# Patient Record
Sex: FEMALE | Race: BLACK OR AFRICAN AMERICAN | NOT HISPANIC OR LATINO | Employment: PART TIME | URBAN - METROPOLITAN AREA
[De-identification: names, ages, dates, MRNs, and addresses within clinical notes are randomized per-mention and may not be internally consistent; named-entity substitution may affect disease eponyms.]

---

## 2017-11-29 ENCOUNTER — HOSPITAL ENCOUNTER (EMERGENCY)
Facility: HOSPITAL | Age: 22
Discharge: HOME | End: 2017-11-29
Attending: EMERGENCY MEDICINE
Payer: MEDICAID

## 2017-11-29 VITALS
HEART RATE: 71 BPM | TEMPERATURE: 98 F | HEIGHT: 64 IN | WEIGHT: 176 LBS | RESPIRATION RATE: 16 BRPM | SYSTOLIC BLOOD PRESSURE: 117 MMHG | BODY MASS INDEX: 30.05 KG/M2 | DIASTOLIC BLOOD PRESSURE: 60 MMHG | OXYGEN SATURATION: 100 %

## 2017-11-29 DIAGNOSIS — S60.012A CONTUSION OF LEFT THUMB WITHOUT DAMAGE TO NAIL, INITIAL ENCOUNTER: ICD-10-CM
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
## 2017-11-29 DIAGNOSIS — S29.019A THORACIC MYOFASCIAL STRAIN, INITIAL ENCOUNTER: Primary | ICD-10-CM

## 2017-11-29 PROCEDURE — 63600175 PHARM REV CODE 636 W HCPCS: Performed by: EMERGENCY MEDICINE

## 2017-11-29 PROCEDURE — 96372 THER/PROPH/DIAG INJ SC/IM: CPT

## 2017-11-29 PROCEDURE — 99283 EMERGENCY DEPT VISIT LOW MDM: CPT | Mod: 25

## 2017-11-29 RX ORDER — HYDROCODONE BITARTRATE AND ACETAMINOPHEN 5; 325 MG/1; MG/1
1 TABLET ORAL EVERY 4 HOURS PRN
Qty: 18 TABLET | Refills: 0 | Status: SHIPPED | OUTPATIENT
Start: 2017-11-29 | End: 2022-11-23

## 2017-11-29 RX ORDER — ORPHENADRINE CITRATE 30 MG/ML
60 INJECTION INTRAMUSCULAR; INTRAVENOUS
Status: COMPLETED | OUTPATIENT
Start: 2017-11-29 | End: 2017-11-29

## 2017-11-29 RX ORDER — MORPHINE SULFATE 2 MG/ML
4 INJECTION, SOLUTION INTRAMUSCULAR; INTRAVENOUS ONCE
Status: COMPLETED | OUTPATIENT
Start: 2017-11-29 | End: 2017-11-29

## 2017-11-29 RX ORDER — CYCLOBENZAPRINE HCL 10 MG
10 TABLET ORAL 3 TIMES DAILY PRN
Qty: 15 TABLET | Refills: 0 | Status: SHIPPED | OUTPATIENT
Start: 2017-11-29 | End: 2017-12-04

## 2017-11-29 RX ADMIN — ORPHENADRINE CITRATE 60 MG: 30 INJECTION INTRAMUSCULAR; INTRAVENOUS at 07:11

## 2017-11-29 RX ADMIN — MORPHINE SULFATE 4 MG: 2 INJECTION, SOLUTION INTRAMUSCULAR; INTRAVENOUS at 07:11

## 2017-11-30 NOTE — ED NOTES
Patient identifiers verified and correct for Manjeet Vizcaino.    LOC: The patient is awake, alert and aware of environment with an appropriate affect, the patient is oriented x 3 and speaking appropriately.  APPEARANCE: Patient resting comfortably and in no acute distress, patient is clean and well groomed, patient's clothing is properly fastened.  SKIN: The skin is warm and dry, color consistent with ethnicity, patient has normal skin turgor and moist mucus membranes, skin intact, no breakdown or bruising noted.  MUSCULOSKELETAL: Patient moving all extremities spontaneously, no obvious swelling or deformities noted. C/o headache with upper back pain. Gait WNL  RESPIRATORY: Airway is open and patent, respirations are spontaneous, patient has a normal effort and rate, no accessory muscle use noted.  CARDIAC: Patient has a normal rate and regular rhythm, no periphreal edema noted, capillary refill < 3 seconds.
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
## 2018-03-05 PROBLEM — Z91.89 AT RISK FOR IMPAIRED MOBILITY DUE TO PAIN: Status: ACTIVE | Noted: 2018-03-05

## 2022-11-23 ENCOUNTER — HOSPITAL ENCOUNTER (EMERGENCY)
Facility: HOSPITAL | Age: 27
Discharge: HOME | End: 2022-11-23
Attending: EMERGENCY MEDICINE
Payer: MEDICAID

## 2022-11-23 VITALS
SYSTOLIC BLOOD PRESSURE: 122 MMHG | OXYGEN SATURATION: 100 % | HEART RATE: 76 BPM | DIASTOLIC BLOOD PRESSURE: 73 MMHG | RESPIRATION RATE: 20 BRPM | WEIGHT: 211 LBS | TEMPERATURE: 98 F | BODY MASS INDEX: 36.22 KG/M2

## 2022-11-23 DIAGNOSIS — O99.891 ASYMPTOMATIC BACTERIURIA DURING PREGNANCY: ICD-10-CM

## 2022-11-23 DIAGNOSIS — O21.9 EXCESSIVE VOMITING IN PREGNANCY: Primary | ICD-10-CM

## 2022-11-23 DIAGNOSIS — R82.71 ASYMPTOMATIC BACTERIURIA DURING PREGNANCY: ICD-10-CM

## 2022-11-23 LAB
ALBUMIN SERPL BCP-MCNC: 3.5 G/DL (ref 3.5–5.2)
ALP SERPL-CCNC: 53 U/L (ref 55–135)
ALT SERPL W/O P-5'-P-CCNC: 21 U/L (ref 10–44)
ANION GAP SERPL CALC-SCNC: 12 MMOL/L (ref 8–16)
AST SERPL-CCNC: 29 U/L (ref 10–40)
BACTERIA #/AREA URNS HPF: ABNORMAL /HPF
BASOPHILS # BLD AUTO: 0.03 K/UL (ref 0–0.2)
BASOPHILS NFR BLD: 0.4 % (ref 0–1.9)
BILIRUB SERPL-MCNC: 0.8 MG/DL (ref 0.1–1)
BILIRUB UR QL STRIP: NEGATIVE
BUN SERPL-MCNC: 7 MG/DL (ref 6–20)
CALCIUM SERPL-MCNC: 8.9 MG/DL (ref 8.7–10.5)
CHLORIDE SERPL-SCNC: 108 MMOL/L (ref 95–110)
CLARITY UR: ABNORMAL
CO2 SERPL-SCNC: 17 MMOL/L (ref 23–29)
COLOR UR: YELLOW
CREAT SERPL-MCNC: 0.8 MG/DL (ref 0.5–1.4)
DIFFERENTIAL METHOD: ABNORMAL
EOSINOPHIL # BLD AUTO: 0.1 K/UL (ref 0–0.5)
EOSINOPHIL NFR BLD: 0.9 % (ref 0–8)
ERYTHROCYTE [DISTWIDTH] IN BLOOD BY AUTOMATED COUNT: 15.1 % (ref 11.5–14.5)
EST. GFR  (NO RACE VARIABLE): >60 ML/MIN/1.73 M^2
GLUCOSE SERPL-MCNC: 82 MG/DL (ref 70–110)
GLUCOSE UR QL STRIP: NEGATIVE
HCT VFR BLD AUTO: 35.5 % (ref 37–48.5)
HGB BLD-MCNC: 11.4 G/DL (ref 12–16)
HGB UR QL STRIP: NEGATIVE
IMM GRANULOCYTES # BLD AUTO: 0.01 K/UL (ref 0–0.04)
IMM GRANULOCYTES NFR BLD AUTO: 0.1 % (ref 0–0.5)
KETONES UR QL STRIP: NEGATIVE
LEUKOCYTE ESTERASE UR QL STRIP: ABNORMAL
LIPASE SERPL-CCNC: 33 U/L (ref 4–60)
LYMPHOCYTES # BLD AUTO: 1.9 K/UL (ref 1–4.8)
LYMPHOCYTES NFR BLD: 25.9 % (ref 18–48)
MCH RBC QN AUTO: 27.2 PG (ref 27–31)
MCHC RBC AUTO-ENTMCNC: 32.1 G/DL (ref 32–36)
MCV RBC AUTO: 85 FL (ref 82–98)
MICROSCOPIC COMMENT: ABNORMAL
MONOCYTES # BLD AUTO: 0.7 K/UL (ref 0.3–1)
MONOCYTES NFR BLD: 9 % (ref 4–15)
NEUTROPHILS # BLD AUTO: 4.7 K/UL (ref 1.8–7.7)
NEUTROPHILS NFR BLD: 63.7 % (ref 38–73)
NITRITE UR QL STRIP: NEGATIVE
NRBC BLD-RTO: 0 /100 WBC
PH UR STRIP: 6 [PH] (ref 5–8)
PLATELET # BLD AUTO: 167 K/UL (ref 150–450)
PMV BLD AUTO: 10.9 FL (ref 9.2–12.9)
POTASSIUM SERPL-SCNC: 5 MMOL/L (ref 3.5–5.1)
PROT SERPL-MCNC: 6.9 G/DL (ref 6–8.4)
PROT UR QL STRIP: NEGATIVE
RBC # BLD AUTO: 4.19 M/UL (ref 4–5.4)
RBC #/AREA URNS HPF: 6 /HPF (ref 0–4)
SODIUM SERPL-SCNC: 137 MMOL/L (ref 136–145)
SP GR UR STRIP: 1.02 (ref 1–1.03)
SQUAMOUS #/AREA URNS HPF: 8 /HPF
UNIDENT CRYS URNS QL MICRO: 2
URN SPEC COLLECT METH UR: ABNORMAL
UROBILINOGEN UR STRIP-ACNC: ABNORMAL EU/DL
WBC # BLD AUTO: 7.44 K/UL (ref 3.9–12.7)
WBC #/AREA URNS HPF: 6 /HPF (ref 0–5)
YEAST URNS QL MICRO: ABNORMAL

## 2022-11-23 PROCEDURE — 85025 COMPLETE CBC W/AUTO DIFF WBC: CPT | Performed by: NURSE PRACTITIONER

## 2022-11-23 PROCEDURE — 81000 URINALYSIS NONAUTO W/SCOPE: CPT | Performed by: NURSE PRACTITIONER

## 2022-11-23 PROCEDURE — 25000003 PHARM REV CODE 250: Performed by: NURSE PRACTITIONER

## 2022-11-23 PROCEDURE — 83690 ASSAY OF LIPASE: CPT | Performed by: EMERGENCY MEDICINE

## 2022-11-23 PROCEDURE — 96361 HYDRATE IV INFUSION ADD-ON: CPT

## 2022-11-23 PROCEDURE — 80053 COMPREHEN METABOLIC PANEL: CPT | Performed by: NURSE PRACTITIONER

## 2022-11-23 PROCEDURE — 25000003 PHARM REV CODE 250: Performed by: EMERGENCY MEDICINE

## 2022-11-23 PROCEDURE — 63600175 PHARM REV CODE 636 W HCPCS: Performed by: EMERGENCY MEDICINE

## 2022-11-23 PROCEDURE — 96374 THER/PROPH/DIAG INJ IV PUSH: CPT

## 2022-11-23 PROCEDURE — 99284 EMERGENCY DEPT VISIT MOD MDM: CPT | Mod: 25

## 2022-11-23 RX ORDER — IBUPROFEN 800 MG/1
TABLET ORAL
COMMUNITY
End: 2022-11-23

## 2022-11-23 RX ORDER — FERROUS SULFATE 325(65) MG
TABLET ORAL
COMMUNITY
End: 2022-11-23

## 2022-11-23 RX ORDER — ETONOGESTREL 68 MG/1
IMPLANT SUBCUTANEOUS
COMMUNITY
End: 2022-11-23

## 2022-11-23 RX ORDER — FAMOTIDINE 20 MG/1
TABLET, FILM COATED ORAL
COMMUNITY
End: 2022-11-23

## 2022-11-23 RX ORDER — AMOXICILLIN 500 MG/1
CAPSULE ORAL
COMMUNITY
End: 2022-11-23

## 2022-11-23 RX ORDER — CEPHALEXIN 500 MG/1
500 CAPSULE ORAL 4 TIMES DAILY
Qty: 20 CAPSULE | Refills: 0 | Status: SHIPPED | OUTPATIENT
Start: 2022-11-23 | End: 2022-11-28

## 2022-11-23 RX ORDER — PROMETHAZINE HYDROCHLORIDE 25 MG/1
25 TABLET ORAL EVERY 8 HOURS PRN
COMMUNITY
Start: 2022-11-22 | End: 2022-11-23

## 2022-11-23 RX ORDER — PRENATAL WITH FERROUS FUM AND FOLIC ACID 3080; 920; 120; 400; 22; 1.84; 3; 20; 10; 1; 12; 200; 27; 25; 2 [IU]/1; [IU]/1; MG/1; [IU]/1; MG/1; MG/1; MG/1; MG/1; MG/1; MG/1; UG/1; MG/1; MG/1; MG/1; MG/1
1 TABLET ORAL DAILY
COMMUNITY
Start: 2022-10-24 | End: 2022-11-23

## 2022-11-23 RX ORDER — PROMETHAZINE HYDROCHLORIDE 25 MG/1
TABLET ORAL
COMMUNITY
Start: 2022-11-22 | End: 2022-11-23

## 2022-11-23 RX ORDER — METRONIDAZOLE 500 MG/1
TABLET ORAL
COMMUNITY
End: 2022-11-23

## 2022-11-23 RX ORDER — ONDANSETRON 2 MG/ML
8 INJECTION INTRAMUSCULAR; INTRAVENOUS
Status: COMPLETED | OUTPATIENT
Start: 2022-11-23 | End: 2022-11-23

## 2022-11-23 RX ORDER — CEPHALEXIN 500 MG/1
500 CAPSULE ORAL
Status: COMPLETED | OUTPATIENT
Start: 2022-11-23 | End: 2022-11-23

## 2022-11-23 RX ORDER — ONDANSETRON HYDROCHLORIDE 8 MG/1
8 TABLET, FILM COATED ORAL EVERY 8 HOURS PRN
COMMUNITY
Start: 2022-11-14 | End: 2023-04-29

## 2022-11-23 RX ORDER — ONDANSETRON 4 MG/1
4 TABLET, FILM COATED ORAL EVERY 6 HOURS
Qty: 12 TABLET | Refills: 0 | OUTPATIENT
Start: 2022-11-23 | End: 2023-04-29

## 2022-11-23 RX ORDER — METRONIDAZOLE 500 MG/1
500 TABLET ORAL 2 TIMES DAILY
COMMUNITY
Start: 2022-09-19 | End: 2022-11-23

## 2022-11-23 RX ORDER — FAMOTIDINE 20 MG/1
20 TABLET, FILM COATED ORAL 2 TIMES DAILY
COMMUNITY
Start: 2022-09-08 | End: 2022-11-23

## 2022-11-23 RX ADMIN — ONDANSETRON HYDROCHLORIDE 8 MG: 2 SOLUTION INTRAMUSCULAR; INTRAVENOUS at 02:11

## 2022-11-23 RX ADMIN — CEPHALEXIN 500 MG: 500 CAPSULE ORAL at 04:11

## 2022-11-23 RX ADMIN — SODIUM CHLORIDE 1000 ML: 0.9 INJECTION, SOLUTION INTRAVENOUS at 02:11

## 2022-11-23 NOTE — ED PROVIDER NOTES
Encounter Date: 11/23/2022       History     Chief Complaint   Patient presents with    Emesis During Pregnancy     Nausea and vomiting x2 weeks. Pt is 8 weeks pregnant and says her OB prescribed her ondansetron and promethazine for nausea, but has not gotten any relief from them. Denying any other complaints at this time      Patient is a 27-year-old female who is 8 weeks pregnant and complains of nausea and vomiting over the past few days.  She is been taking Phenergan and Zofran at home without relief.  She has mild lower abdominal cramping, but no vaginal bleeding.  No syncope.  She denies fever or chills.    Review of patient's allergies indicates:   Allergen Reactions    Aspirin Other (See Comments)     unknown     Past Medical History:   Diagnosis Date    Mental disorder     anxiety    Sickle cell anemia     sickle cell trait per prenatals, pt denies     No past surgical history on file.  No family history on file.  Social History     Tobacco Use    Smoking status: Never    Smokeless tobacco: Never   Substance Use Topics    Alcohol use: No    Drug use: No     Comment: + THC on prenatals, pt denies.      Review of Systems   Constitutional:  Positive for fatigue. Negative for chills and fever.   Respiratory:  Negative for shortness of breath.    Cardiovascular:  Negative for chest pain.   Gastrointestinal:  Positive for nausea and vomiting. Negative for diarrhea.   Genitourinary:  Positive for pelvic pain. Negative for vaginal bleeding.   Skin:  Negative for color change and rash.   Neurological:  Negative for syncope.     Physical Exam     Initial Vitals   BP Pulse Resp Temp SpO2   11/23/22 1358 11/23/22 1358 11/23/22 1358 11/23/22 1358 11/23/22 1819   117/78 78 18 98.5 °F (36.9 °C) 100 %      MAP       --                Physical Exam    Nursing note and vitals reviewed.  Constitutional: No distress.   HENT:   Dry mucous membranes.   Eyes: Conjunctivae and EOM are normal.   Neck: Neck supple.   Cardiovascular:   Normal rate, regular rhythm and normal heart sounds.           Pulmonary/Chest: Breath sounds normal.   Abdominal: Abdomen is soft. There is no abdominal tenderness.   Musculoskeletal:         General: No edema. Normal range of motion.      Cervical back: Neck supple.     Neurological: She is oriented to person, place, and time.   Skin: Skin is warm and dry.       ED Course   Procedures  Labs Reviewed   URINALYSIS, REFLEX TO URINE CULTURE - Abnormal; Notable for the following components:       Result Value    Appearance, UA Hazy (*)     Urobilinogen, UA 4.0-6.0 (*)     Leukocytes, UA 3+ (*)     All other components within normal limits    Narrative:     Specimen Source->Urine   COMPREHENSIVE METABOLIC PANEL - Abnormal; Notable for the following components:    CO2 17 (*)     Alkaline Phosphatase 53 (*)     All other components within normal limits   CBC W/ AUTO DIFFERENTIAL - Abnormal; Notable for the following components:    Hemoglobin 11.4 (*)     Hematocrit 35.5 (*)     RDW 15.1 (*)     All other components within normal limits   URINALYSIS MICROSCOPIC - Abnormal; Notable for the following components:    RBC, UA 6 (*)     WBC, UA 6 (*)     All other components within normal limits    Narrative:     Specimen Source->Urine   LIPASE          Imaging Results    None          Medications   sodium chloride 0.9% bolus 1,000 mL (0 mLs Intravenous Stopped 11/23/22 1549)   ondansetron injection 8 mg (8 mg Intravenous Given 11/23/22 1450)   cephALEXin capsule 500 mg (500 mg Oral Given 11/23/22 1646)     Medical Decision Making:   Initial Assessment:   27-year-old pregnant female with vomiting.  ED Management:  IV fluids and Zofran have ordered for the patient.  Lab work is pending at the time of shift change and further care of the patient will be turned over to Dr. Banuelos, pending results, reassessment and final disposition.                        Clinical Impression:   Final diagnoses:  [O21.9] Excessive vomiting in  pregnancy (Primary)  [O99.891, R82.71] Asymptomatic bacteriuria during pregnancy      ED Disposition Condition    Discharge Stable          ED Prescriptions       Medication Sig Dispense Start Date End Date Auth. Provider    cephALEXin (KEFLEX) 500 MG capsule Take 1 capsule (500 mg total) by mouth 4 (four) times daily. for 5 days 20 capsule 11/23/2022 11/28/2022 Jade Banuelos MD    ondansetron (ZOFRAN) 4 MG tablet Take 1 tablet (4 mg total) by mouth every 6 (six) hours. 12 tablet 11/23/2022 -- Jade Banuelos MD          Follow-up Information       Follow up With Specialties Details Why Contact Info    [facility]   [address] [place] [address] [id number] [phone]             Paras Hoover MD  11/26/22 0612

## 2022-11-24 NOTE — DISCHARGE INSTRUCTIONS
You have bacteria in your urine and need to be treated for this as you are pregnant. Please follow up with the OB. Take the anti-nausea medication as needed for nausea and vomiting.

## 2022-12-06 ENCOUNTER — HOSPITAL ENCOUNTER (EMERGENCY)
Facility: HOSPITAL | Age: 27
Discharge: HOME | End: 2022-12-06
Attending: EMERGENCY MEDICINE
Payer: MEDICAID

## 2022-12-06 VITALS
SYSTOLIC BLOOD PRESSURE: 114 MMHG | DIASTOLIC BLOOD PRESSURE: 71 MMHG | HEIGHT: 64 IN | BODY MASS INDEX: 35.68 KG/M2 | TEMPERATURE: 99 F | RESPIRATION RATE: 18 BRPM | WEIGHT: 209 LBS | HEART RATE: 84 BPM | OXYGEN SATURATION: 98 %

## 2022-12-06 DIAGNOSIS — O20.0 THREATENED MISCARRIAGE: Primary | ICD-10-CM

## 2022-12-06 LAB
B-HCG UR QL: POSITIVE
CTP QC/QA: YES
HCG INTACT+B SERPL-ACNC: NORMAL MIU/ML

## 2022-12-06 PROCEDURE — 99284 EMERGENCY DEPT VISIT MOD MDM: CPT | Mod: 25,ER

## 2022-12-06 PROCEDURE — 81025 URINE PREGNANCY TEST: CPT | Mod: ER | Performed by: EMERGENCY MEDICINE

## 2022-12-06 PROCEDURE — 84702 CHORIONIC GONADOTROPIN TEST: CPT | Mod: ER | Performed by: EMERGENCY MEDICINE

## 2022-12-06 NOTE — ED PROVIDER NOTES
Encounter Date: 12/6/2022       History     Chief Complaint   Patient presents with    Vaginal Bleeding     Pt reports being 10 weeks pregnant and states she woke up to feeling wet, and noticed she was bleeding. Pt states she put on a pad and realized it was heavier and the blood was darker. Pt denies abdominal cramping. Pt denies falling or being hit in the stomach.     27-year-old female approximately 10 weeks pregnant presenting with vaginal bleeding and mild cramping this morning.  Patient says she had an ultrasound done at 6 weeks but it did not show anything.  She denies fever, dysuria.    Review of patient's allergies indicates:   Allergen Reactions    Aspirin Other (See Comments)     unknown     Past Medical History:   Diagnosis Date    Mental disorder     anxiety    Sickle cell anemia     sickle cell trait per prenatals, pt denies     No past surgical history on file.  No family history on file.  Social History     Tobacco Use    Smoking status: Never    Smokeless tobacco: Never   Substance Use Topics    Alcohol use: No    Drug use: No     Comment: + THC on prenatals, pt denies.      Review of Systems   Constitutional:  Negative for fever.   Eyes:  Negative for pain.   Respiratory:  Negative for shortness of breath.    Cardiovascular:  Negative for chest pain.   Gastrointestinal:  Negative for abdominal pain, nausea and vomiting.   Genitourinary:  Positive for pelvic pain and vaginal bleeding. Negative for difficulty urinating.   Musculoskeletal:  Negative for back pain and neck pain.   Neurological:  Negative for headaches.   Psychiatric/Behavioral:  Negative for confusion.      Physical Exam     Initial Vitals [12/06/22 0656]   BP Pulse Resp Temp SpO2   135/73 89 18 98.6 °F (37 °C) 100 %      MAP       --         Physical Exam    Nursing note and vitals reviewed.  HENT:   Head: Atraumatic.   Eyes: Conjunctivae and EOM are normal.   Cardiovascular:  Normal rate.           Pulmonary/Chest: No respiratory  distress.   Abdominal: Abdomen is soft. She exhibits no distension. There is no abdominal tenderness. There is no rebound.     Neurological: She is alert and oriented to person, place, and time.       ED Course   Procedures  Labs Reviewed   POCT URINE PREGNANCY - Abnormal; Notable for the following components:       Result Value    POC Preg Test, Ur Positive (*)     All other components within normal limits   HCG, QUANTITATIVE          Imaging Results              US OB <14 Wks, TransAbd, Single Gestation (Final result)  Result time 12/06/22 08:34:04      Final result by JUAN CARLOS Beyer Sr., MD (12/06/22 08:34:04)                   Impression:      1. There is an intrauterine pregnancy with a fetal heart rate of 163 beats per minute.    2. Based on ultrasound measurements, the calculated gestational age is 11 weeks and 1 day.    3. The maternal ovaries were not visualized.    4.  I recommend a follow-up ultrasound examination in 7 weeks for a fetal anatomical survey.      Electronically signed by: Cristobal Beyer MD  Date:    12/06/2022  Time:    08:34               Narrative:    EXAMINATION:  US OB <14 WEEKS TRANSABDOM, SINGLE GESTATION    CLINICAL HISTORY:  Vaginal bleeding;    TECHNIQUE:  Multiple static ultrasound images are submitted for interpretation.    COMPARISON:  None    FINDINGS:  The maternal uterus measures 12.7 cm in craniocaudal dimension by 9.0 cm in AP dimension by 9.3 cm in medial-lateral dimension.  There is an intrauterine pregnancy with a fetal heart rate of 163 beats per minute.  The gestational sac, yolk sac, and fetal pole are normal in appearance.  There is a normal appearing amount of amniotic fluid.  The mean crown-rump length is 41.94 mm.  Based on ultrasound measurements, the calculated gestational age is 11 weeks and 1 day.    The maternal urinary bladder is normal in appearance.  There is no significant amount of free fluid visualized within the maternal pelvis.  The maternal ovaries  were not visualized.                                       Medications - No data to display  Medical Decision Making:   Initial Assessment:   27-year-old female presenting with first-trimester vaginal bleeding.  Vital signs unremarkable.  Abdomen benign.  Patient appears to be in no distress.  UPT positive.  Pelvic ultrasound shows intrauterine gestation without evidence of extra uterine masses.  Findings consistent with threatened miscarriage.  Plan for close OB follow-up.                        Clinical Impression:   Final diagnoses:  [O20.0] Threatened miscarriage (Primary)      ED Disposition Condition    Discharge Stable          ED Prescriptions    None       Follow-up Information       Follow up With Specialties Details Why Contact Info    Your OBGYN  Schedule an appointment as soon as possible for a visit in 3 days               Humble Cavanaugh MD  12/06/22 3483

## 2023-04-29 ENCOUNTER — HOSPITAL ENCOUNTER (EMERGENCY)
Facility: HOSPITAL | Age: 28
Discharge: HOME | End: 2023-04-29
Attending: FAMILY MEDICINE
Payer: MEDICAID

## 2023-04-29 VITALS
OXYGEN SATURATION: 100 % | RESPIRATION RATE: 19 BRPM | SYSTOLIC BLOOD PRESSURE: 103 MMHG | BODY MASS INDEX: 34.31 KG/M2 | DIASTOLIC BLOOD PRESSURE: 59 MMHG | TEMPERATURE: 98 F | HEIGHT: 64 IN | HEART RATE: 71 BPM | WEIGHT: 201 LBS

## 2023-04-29 DIAGNOSIS — E87.6 HYPOKALEMIA: ICD-10-CM

## 2023-04-29 DIAGNOSIS — K29.01 ACUTE SUPERFICIAL GASTRITIS WITH HEMORRHAGE: Primary | ICD-10-CM

## 2023-04-29 DIAGNOSIS — R74.8 ELEVATED LIVER ENZYMES: ICD-10-CM

## 2023-04-29 DIAGNOSIS — Z3A.30 30 WEEKS GESTATION OF PREGNANCY: ICD-10-CM

## 2023-04-29 LAB
ALBUMIN SERPL BCP-MCNC: 3.4 G/DL (ref 3.5–5.2)
ALP SERPL-CCNC: 86 U/L (ref 38–126)
ALT SERPL W/O P-5'-P-CCNC: 80 U/L (ref 10–44)
ANION GAP SERPL CALC-SCNC: 6 MMOL/L (ref 8–16)
APTT PPP: <21 SEC (ref 21–32)
AST SERPL-CCNC: 68 U/L (ref 15–46)
BASOPHILS # BLD AUTO: 0.04 K/UL (ref 0–0.2)
BASOPHILS NFR BLD: 0.4 % (ref 0–1.9)
BILIRUB SERPL-MCNC: 0.8 MG/DL (ref 0.1–1)
BILIRUB UR QL STRIP: NEGATIVE
CALCIUM SERPL-MCNC: 8.7 MG/DL (ref 8.7–10.5)
CHLORIDE SERPL-SCNC: 109 MMOL/L (ref 95–110)
CLARITY UR REFRACT.AUTO: CLEAR
CO2 SERPL-SCNC: 19 MMOL/L (ref 23–29)
COLOR UR AUTO: YELLOW
CREAT SERPL-MCNC: 0.61 MG/DL (ref 0.5–1.4)
DIFFERENTIAL METHOD: ABNORMAL
EOSINOPHIL # BLD AUTO: 0.4 K/UL (ref 0–0.5)
EOSINOPHIL NFR BLD: 3.8 % (ref 0–8)
ERYTHROCYTE [DISTWIDTH] IN BLOOD BY AUTOMATED COUNT: 15.6 % (ref 11.5–14.5)
EST. GFR  (NO RACE VARIABLE): >60 ML/MIN/1.73 M^2
GLUCOSE SERPL-MCNC: 83 MG/DL (ref 70–110)
GLUCOSE UR QL STRIP: NEGATIVE
HCT VFR BLD AUTO: 32.2 % (ref 37–48.5)
HGB BLD-MCNC: 10.4 G/DL (ref 12–16)
HGB UR QL STRIP: NEGATIVE
IMM GRANULOCYTES # BLD AUTO: 0.02 K/UL (ref 0–0.04)
IMM GRANULOCYTES NFR BLD AUTO: 0.2 % (ref 0–0.5)
INR PPP: 0.9 (ref 0.8–1.2)
KETONES UR QL STRIP: ABNORMAL
LEUKOCYTE ESTERASE UR QL STRIP: NEGATIVE
LIPASE SERPL-CCNC: 160 U/L (ref 23–300)
LYMPHOCYTES # BLD AUTO: 2.1 K/UL (ref 1–4.8)
LYMPHOCYTES NFR BLD: 21.4 % (ref 18–48)
MCH RBC QN AUTO: 26.6 PG (ref 27–31)
MCHC RBC AUTO-ENTMCNC: 32.3 G/DL (ref 32–36)
MCV RBC AUTO: 82 FL (ref 82–98)
MONOCYTES # BLD AUTO: 0.7 K/UL (ref 0.3–1)
MONOCYTES NFR BLD: 6.7 % (ref 4–15)
NEUTROPHILS # BLD AUTO: 6.5 K/UL (ref 1.8–7.7)
NEUTROPHILS NFR BLD: 67.5 % (ref 38–73)
NITRITE UR QL STRIP: NEGATIVE
NRBC BLD-RTO: 0 /100 WBC
PH UR STRIP: 6 [PH] (ref 5–8)
PLATELET # BLD AUTO: 149 K/UL (ref 150–450)
PMV BLD AUTO: 10.9 FL (ref 9.2–12.9)
POTASSIUM SERPL-SCNC: 3.1 MMOL/L (ref 3.5–5.1)
PROT SERPL-MCNC: 6.8 G/DL (ref 6–8.4)
PROT UR QL STRIP: NEGATIVE
PROTHROMBIN TIME: 9.9 SEC (ref 9–12.5)
RBC # BLD AUTO: 3.91 M/UL (ref 4–5.4)
SODIUM SERPL-SCNC: 134 MMOL/L (ref 136–145)
SP GR UR STRIP: 1.01 (ref 1–1.03)
URN SPEC COLLECT METH UR: ABNORMAL
UROBILINOGEN UR STRIP-ACNC: 1 EU/DL
UUN UR-MCNC: 2 MG/DL (ref 7–17)
WBC # BLD AUTO: 9.69 K/UL (ref 3.9–12.7)

## 2023-04-29 PROCEDURE — 63600175 PHARM REV CODE 636 W HCPCS: Mod: ER | Performed by: FAMILY MEDICINE

## 2023-04-29 PROCEDURE — 85730 THROMBOPLASTIN TIME PARTIAL: CPT | Mod: ER | Performed by: FAMILY MEDICINE

## 2023-04-29 PROCEDURE — 80053 COMPREHEN METABOLIC PANEL: CPT | Mod: ER | Performed by: FAMILY MEDICINE

## 2023-04-29 PROCEDURE — C9113 INJ PANTOPRAZOLE SODIUM, VIA: HCPCS | Mod: ER | Performed by: FAMILY MEDICINE

## 2023-04-29 PROCEDURE — 96375 TX/PRO/DX INJ NEW DRUG ADDON: CPT | Mod: ER

## 2023-04-29 PROCEDURE — 85610 PROTHROMBIN TIME: CPT | Mod: ER | Performed by: FAMILY MEDICINE

## 2023-04-29 PROCEDURE — 85025 COMPLETE CBC W/AUTO DIFF WBC: CPT | Mod: ER | Performed by: FAMILY MEDICINE

## 2023-04-29 PROCEDURE — 25000003 PHARM REV CODE 250: Mod: ER | Performed by: FAMILY MEDICINE

## 2023-04-29 PROCEDURE — 96374 THER/PROPH/DIAG INJ IV PUSH: CPT | Mod: ER

## 2023-04-29 PROCEDURE — 99284 EMERGENCY DEPT VISIT MOD MDM: CPT | Mod: ER

## 2023-04-29 PROCEDURE — 83690 ASSAY OF LIPASE: CPT | Mod: ER | Performed by: FAMILY MEDICINE

## 2023-04-29 PROCEDURE — 81003 URINALYSIS AUTO W/O SCOPE: CPT | Mod: ER | Performed by: FAMILY MEDICINE

## 2023-04-29 RX ORDER — PANTOPRAZOLE SODIUM 40 MG/10ML
40 INJECTION, POWDER, LYOPHILIZED, FOR SOLUTION INTRAVENOUS
Status: COMPLETED | OUTPATIENT
Start: 2023-04-29 | End: 2023-04-29

## 2023-04-29 RX ORDER — PANTOPRAZOLE SODIUM 40 MG/1
40 TABLET, DELAYED RELEASE ORAL DAILY
Qty: 30 TABLET | Refills: 0 | Status: SHIPPED | OUTPATIENT
Start: 2023-04-29

## 2023-04-29 RX ORDER — FAMOTIDINE 10 MG/ML
20 INJECTION INTRAVENOUS
Status: COMPLETED | OUTPATIENT
Start: 2023-04-29 | End: 2023-04-29

## 2023-04-29 RX ORDER — POTASSIUM CHLORIDE 20 MEQ/1
20 TABLET, EXTENDED RELEASE ORAL
Status: COMPLETED | OUTPATIENT
Start: 2023-04-29 | End: 2023-04-29

## 2023-04-29 RX ORDER — ONDANSETRON 2 MG/ML
4 INJECTION INTRAMUSCULAR; INTRAVENOUS
Status: COMPLETED | OUTPATIENT
Start: 2023-04-29 | End: 2023-04-29

## 2023-04-29 RX ADMIN — ONDANSETRON HYDROCHLORIDE 4 MG: 2 SOLUTION INTRAMUSCULAR; INTRAVENOUS at 10:04

## 2023-04-29 RX ADMIN — POTASSIUM CHLORIDE 20 MEQ: 1500 TABLET, EXTENDED RELEASE ORAL at 10:04

## 2023-04-29 RX ADMIN — PANTOPRAZOLE SODIUM 40 MG: 40 INJECTION, POWDER, LYOPHILIZED, FOR SOLUTION INTRAVENOUS at 11:04

## 2023-04-29 RX ADMIN — FAMOTIDINE 20 MG: 10 INJECTION, SOLUTION INTRAVENOUS at 10:04

## 2023-04-30 NOTE — ED PROVIDER NOTES
Encounter Date: 2023       History     Chief Complaint   Patient presents with    Decreased Fetal Movement     Reports to ED c c/o decreased fetal movement. States started vomiting blood 20 min pta and since has not felt her baby move. 30 weeks pregnant. Set to deliver to Dr. Cevallos at [facility]. STEPHANIE 23. Denies any fluid leakage or vaginal bleeding. . Pt reports eating pizza at 1930 and vomited it up      27-year-old female complains of vomiting blood around 7:30 p.m..  Patient vomited after eating pizza.  No abdominal pain.  Has been having nausea and vomiting entire pregnancy.   4 pregnancy at 30 weeks.  STEPHANIE 2023.  Denies vaginal discharge or bleeding or fluid leak.    The history is provided by the patient.   Review of patient's allergies indicates:   Allergen Reactions    Aspirin Other (See Comments)     unknown     Past Medical History:   Diagnosis Date    Mental disorder     anxiety    Sickle cell anemia     sickle cell trait per prenatals, pt denies     History reviewed. No pertinent surgical history.  History reviewed. No pertinent family history.  Social History     Tobacco Use    Smoking status: Never    Smokeless tobacco: Never   Substance Use Topics    Alcohol use: No    Drug use: No     Comment: + THC on prenatals, pt denies.      Review of Systems   Constitutional:  Negative for activity change, appetite change, chills and fever.   HENT:  Negative for congestion, ear discharge, rhinorrhea, sinus pressure, sinus pain, sore throat and trouble swallowing.    Eyes:  Negative for photophobia, pain, discharge, redness, itching and visual disturbance.   Respiratory:  Negative for cough, chest tightness, shortness of breath and wheezing.    Cardiovascular:  Negative for chest pain, palpitations and leg swelling.   Gastrointestinal:  Positive for vomiting. Negative for abdominal distention, abdominal pain, constipation, diarrhea and nausea.   Genitourinary:  Negative for dysuria, flank  pain, frequency and hematuria.   Musculoskeletal:  Negative for back pain, gait problem, neck pain and neck stiffness.   Skin:  Negative for rash and wound.   Neurological:  Negative for dizziness, tremors, seizures, syncope, speech difficulty, weakness, light-headedness, numbness and headaches.   Psychiatric/Behavioral:  Negative for behavioral problems, confusion, hallucinations and sleep disturbance. The patient is not nervous/anxious.    All other systems reviewed and are negative.    Physical Exam     Initial Vitals [04/29/23 2117]   BP Pulse Resp Temp SpO2   (!) 106/56 88 19 98.4 °F (36.9 °C) 100 %      MAP       --         Physical Exam    Nursing note and vitals reviewed.  Constitutional: Vital signs are normal. She appears well-developed and well-nourished. She is active. No distress.   HENT:   Head: Normocephalic.   Nose: Nose normal.   Mouth/Throat: Oropharynx is clear and moist and mucous membranes are normal.   Eyes: Conjunctivae, EOM and lids are normal.   Neck: Neck supple.   Normal range of motion.  Cardiovascular:  Normal rate, regular rhythm, S1 normal, S2 normal and normal heart sounds.           Pulmonary/Chest: Breath sounds normal. No respiratory distress. She has no wheezes. She has no rhonchi. She has no rales.   Abdominal: Abdomen is soft. Bowel sounds are normal. She exhibits no distension. There is no abdominal tenderness. There is no guarding.   Musculoskeletal:         General: Normal range of motion.      Right upper arm: Normal.      Left upper arm: Normal.      Cervical back: Normal range of motion and neck supple.      Right lower leg: Normal.      Left lower leg: Normal.     Neurological: She is alert and oriented to person, place, and time. She has normal strength. GCS score is 15. GCS eye subscore is 4. GCS verbal subscore is 5. GCS motor subscore is 6.   Skin: Skin is warm. Capillary refill takes less than 2 seconds.   Psychiatric: She has a normal mood and affect. Her speech is  normal and behavior is normal. Thought content normal. Cognition and memory are normal.       ED Course   Procedures  Labs Reviewed   CBC W/ AUTO DIFFERENTIAL - Abnormal; Notable for the following components:       Result Value    RBC 3.91 (*)     Hemoglobin 10.4 (*)     Hematocrit 32.2 (*)     MCH 26.6 (*)     RDW 15.6 (*)     Platelets 149 (*)     All other components within normal limits   COMPREHENSIVE METABOLIC PANEL - Abnormal; Notable for the following components:    Sodium 134 (*)     Potassium 3.1 (*)     CO2 19 (*)     BUN 2 (*)     Albumin 3.4 (*)     AST 68 (*)     ALT 80 (*)     Anion Gap 6 (*)     All other components within normal limits   URINALYSIS, REFLEX TO URINE CULTURE - Abnormal; Notable for the following components:    Ketones, UA Trace (*)     All other components within normal limits    Narrative:     Preferred Collection Type->Urine, Clean Catch  Specimen Source->Urine   APTT   PROTIME-INR   LIPASE   LIPASE          Imaging Results    None          Medications   ondansetron injection 4 mg (4 mg Intravenous Given 4/29/23 2223)   famotidine (PF) injection 20 mg (20 mg Intravenous Given 4/29/23 2223)   potassium chloride SA CR tablet 20 mEq (20 mEq Oral Given 4/29/23 2250)   pantoprazole injection 40 mg (40 mg Intravenous Given 4/29/23 2316)     Medical Decision Making:   Initial Assessment:   Vomiting and suspected blood in vomiting.  Also concern for decreased fetal movements  Normal abdominal examination with gravid uterus.  Differential Diagnosis:   Upper GI bleeding, food color.  Gastritis.  Mucosal tear.  Decreased fetal movements, decreased fetal activity.  Clinical Tests:   Lab Tests: Ordered and Reviewed  The following lab test(s) were unremarkable: CBC, CMP, PTT, PT and Lipase       <> Summary of Lab: Except- sodium 134, potassium 3.1 secondary to vomiting  Elevated AST and ALT.  Normal total bili.  Secondary to vomiting  Hemoglobin 10.4 hematocrit 32.2 stable.  Platelet 149 close to  her baseline  ED Management:  Normal fetal heart tones at 165 beats per minute with good variability.  Oral cavity examination is normal.  Abdominal examination normal with cavitary uterus at 30 weeks.  Patient started feeling fetal movements.  Hypokalemia will be replaced with potassium.  Slightly elevated liver enzymes discuss with Dr. Rao advised to follow-up at clinic.  Patient Protonix refill for GERD.  Other:   I have discussed this case with another health care provider.       <> Summary of the Discussion:  Dr. Rao advised to follow up with clinic at Dr. Xiomara davidson.                        Clinical Impression:   Final diagnoses:  [K29.01] Acute superficial gastritis with hemorrhage (Primary)  [E87.6] Hypokalemia  [Z3A.30] 30 weeks gestation of pregnancy  [R74.8] Elevated liver enzymes        ED Disposition Condition    Discharge Stable          ED Prescriptions       Medication Sig Dispense Start Date End Date Auth. Provider    pantoprazole (PROTONIX) 40 MG tablet Take 1 tablet (40 mg total) by mouth once daily. 30 tablet 4/29/2023 -- Wan Martinez MD          Follow-up Information       Follow up With Specialties Details Why Contact Info    Tha Centeno MD Pediatrics Schedule an appointment as soon as possible for a visit in 2 days  [address] [facility] [address] [phone]      Bobo Cevallos MD Obstetrics Call in 2 days  [address] [facility] [address] [phone]      [facility] - Emergency Dept Emergency Medicine  If symptoms worsen [address] [facility] [facility] [address] [phone]             Wan Martinez MD  04/30/23 8622

## 2023-04-30 NOTE — ED TRIAGE NOTES
Reports to ED c c/o decreased fetal movement. States started vomiting blood 20 min pta and since has not felt her baby move. 30 weeks pregnant. Set to deliver to Dr. Cevallos at [facility]. STEPHANIE 23. Denies any fluid leakage or vaginal bleeding. . Pt reports eating pizza at 1930 and vomited it up

## 2023-06-27 ENCOUNTER — PATIENT MESSAGE (OUTPATIENT)
Dept: RESEARCH | Facility: HOSPITAL | Age: 28
End: 2023-06-27
Payer: MEDICAID

## 2023-07-19 ENCOUNTER — PATIENT MESSAGE (OUTPATIENT)
Dept: RESEARCH | Facility: HOSPITAL | Age: 28
End: 2023-07-19
Payer: MEDICAID